# Patient Record
Sex: FEMALE | Race: WHITE
[De-identification: names, ages, dates, MRNs, and addresses within clinical notes are randomized per-mention and may not be internally consistent; named-entity substitution may affect disease eponyms.]

---

## 2017-06-29 ENCOUNTER — HOSPITAL ENCOUNTER (INPATIENT)
Dept: HOSPITAL 36 - ER | Age: 72
LOS: 4 days | Discharge: NURSING, REHAB OR LONG-TERM CARE | DRG: 640 | End: 2017-07-03
Payer: MEDICARE

## 2017-06-29 DIAGNOSIS — K21.9: ICD-10-CM

## 2017-06-29 DIAGNOSIS — E11.9: ICD-10-CM

## 2017-06-29 DIAGNOSIS — M19.90: ICD-10-CM

## 2017-06-29 DIAGNOSIS — E87.1: Primary | ICD-10-CM

## 2017-06-29 DIAGNOSIS — Z88.0: ICD-10-CM

## 2017-06-29 DIAGNOSIS — Z88.1: ICD-10-CM

## 2017-06-29 DIAGNOSIS — I11.0: ICD-10-CM

## 2017-06-29 DIAGNOSIS — Z88.8: ICD-10-CM

## 2017-06-29 DIAGNOSIS — J44.9: ICD-10-CM

## 2017-06-29 DIAGNOSIS — F03.90: ICD-10-CM

## 2017-06-29 DIAGNOSIS — I50.9: ICD-10-CM

## 2017-06-29 DIAGNOSIS — I25.10: ICD-10-CM

## 2017-06-29 DIAGNOSIS — G93.41: ICD-10-CM

## 2017-06-29 DIAGNOSIS — E78.5: ICD-10-CM

## 2017-06-29 DIAGNOSIS — F25.8: ICD-10-CM

## 2017-06-29 LAB
ALBUMIN/GLOB SERPL: 1.2 {RATIO} (ref 1–1.8)
ALP SERPL-CCNC: 51 U/L (ref 34–104)
ALT SERPL-CCNC: 10 U/L (ref 7–52)
ANION GAP SERPL CALC-SCNC: 6.8 MMOL/L (ref 7–16)
AST SERPL-CCNC: 16 U/L (ref 13–39)
BASOPHILS NFR BLD AUTO: 0 % (ref 0–2)
BILIRUB SERPL-MCNC: 0.4 MG/DL (ref 0.3–1)
BUN SERPL-MCNC: 20 MG/DL (ref 7–25)
BUN/CREAT SERPL: 20
CALCIUM SERPL-MCNC: 9.4 MG/DL (ref 8.6–10.3)
CHLORIDE SERPL-SCNC: 98 MEQ/L (ref 98–107)
CO2 SERPL-SCNC: 26.2 MEQ/L (ref 21–31)
CREAT SERPL-MCNC: 1 MG/DL (ref 0.6–1.2)
EOSINOPHIL NFR BLD AUTO: 1.9 % (ref 0–5)
ERYTHROCYTE [DISTWIDTH] IN BLOOD BY AUTOMATED COUNT: 13.2 % (ref 11.5–20)
GLOBULIN SER-MCNC: 2.8 GM/DL
GLUCOSE SERPL-MCNC: 108 MG/DL (ref 70–105)
HCT VFR BLD CALC: 32.9 % (ref 35–45)
HGB BLD-MCNC: 11.3 GM/DL (ref 11.7–16.1)
LYMPHOCYTES NFR BLD AUTO: 26.4 % (ref 20–50)
MCH RBC QN AUTO: 28.4 PG (ref 27–31)
MCHC RBC AUTO-ENTMCNC: 34.2 PG (ref 28–36)
MCV RBC AUTO: 83.3 FL (ref 81–100)
MONOCYTES NFR BLD AUTO: 13.3 % (ref 2–10)
NEUTROPHILS # BLD: 3.7 TH/CMM (ref 1.8–8)
NEUTROPHILS NFR BLD AUTO: 58.4 % (ref 40–80)
PLATELET # BLD: 88 TH/CMM (ref 150–400)
PMV BLD AUTO: 8.2 FL
POTASSIUM SERPL-SCNC: 4 MEQ/L (ref 3.5–5.1)
RBC # BLD AUTO: 3.95 MIL/CMM (ref 3.8–5.2)
SODIUM SERPL-SCNC: 127 MEQ/L (ref 136–145)
WBC # BLD AUTO: 6.4 TH/CMM (ref 4.8–10.8)

## 2017-06-29 PROCEDURE — Z7610: HCPCS

## 2017-06-29 NOTE — ED PHYSICIAN CHART
Chief Complaint/HPI





- Patient Information


Date Seen:: 06/29/17


Time Seen:: 16:30


Chief Complaint:: agitation


History of Present Illness:: 





Patient is sent from the nursing home for agitation to be admitted to [facility] is medically stable.


Allergies:: 


 Allergies











Allergy/AdvReac Type Severity Reaction Status Date / Time


 


cefazolin [From Ancef] Allergy   Verified 06/14/17 18:53


 


meropenem Allergy   Verified 06/14/17 18:53


 


Penicillins Allergy   Verified 06/14/17 18:54


 


prochlorperazine Allergy   Verified 06/14/17 18:53





[From Compazine]     











Historian:: EMS


Review:: Nurse's Note Reviewed





Review of Systems





- Review of Systems


General/Constitutional: No fever, No chills


Skin: No skin lesions


Head: No headache


Eyes: No loss of vision


ENT: No earache


Neck: No neck pain


Cardio Vascular: No chest pain


Pulmonary: No SOB


GI: No nausea, No vomiting


G/U: No dysuria


Musculoskeletal: No bone or joint pain


Endocrine: No polyuria


Psychiatric: Prior psych history


Hematopoietic: No bruising


Allergic/Immuno: No urticaria


Neurological: No syncope





Past Medical History





- Past Medical History


Past Medical History: HTN, DM, CHF, Asthma/COPD, Dementia, Other (

atherosclerotic heart disease; is affective disorder)


Family History: Other (unavailable)


Social History: Care Facility


Surgical History: other (unavailable)


Psychiatricy History: Other (schizoaffective disorder)


Medication: Reviewed





Family Medical History





- Family Member


  ** Father


History Unknown: Yes


Ethnicity: Unknown


Living Status: Unknown


Hx Family Cancer:  (UNKNOWN)


Hx Family Coronary Artery Disease:  (UNKNOWN)


Hx Family Congestive Heart Failure:  (UNKNOWN)


Hx Family Hypertension:  (UNKNOWN)


Hx Family Stroke:  (UNKNOWN)


Hx Family Diabetes:  (UNKNOWN)


Hx Family Seizures:  (UNKNOWN)


Hx Family Dementia:  (UNKNOWN)


Hx Family AIDS:  (UNKNOWN)


Hx Family HIV: No


Hx Family COPD:  (UNKNOWN)


Hx Family Hepatitis:  (UNKNOWN)


Hx Family Psychiatric Problems:  (UNKNOWN)


Hx Family Tuberculosis:  (UNKNOWN)





  ** Mother


History Unknown: Yes


Ethnicity: Unknown


Living Status: Unknown


Hx Family Cancer:  (unknown)


Hx Family Coronary Artery Disease:  (unknown)


Hx Family Congestive Heart Failure:  (unknown)


Hx Family Hypertension:  (unknown)


Hx Family Stroke:  (unknown)


Hx Family Diabetes:  (unknown)


Hx Family Seizures:  (unknown)


Hx Family Dementia:  (unknown)


Hx Family AIDS:  (unknown)


Hx Family COPD:  (unknown)


Hx Family Hepatitis:  (unknown)


Hx Family Psychiatric Problems:  (unknown)


Hx Family Tuberculosis:  (unknown)





Physical Exam





- Physical Examination


General/Constitutional: Awake, Well-developed, well-nourished, Alert


Other Gen/Cons comments:: 


Yelling, screaming, flailing around on the gurney


Head: Atraumatic


Eyes: Lids, conjuctiva normal, PERRL


Skin: Nl inspection


ENMT: External ears, nose nl


Neck: No nuchal rigidity


Respiratory: Nl effort/Exclusion, Clear to Auscultation


Cardio Vascular: RRR


GI: No tenderness/rebounding/guarding


: No CVA tenderness


Extremities: No tenderness or effusion


Neuro/Psych: No focal deficits





Assessment





- Assessment


General Assessment: 





patient refuses all tests





ED Septic Shock





- .


Is Septic Shock (SBP<90, OR Lactate>4 mmol\L) present?: No





Reassessment (Disposition)





- Reassessment


Reassessment Condition:: Improved





- Diagnosis


Diagnosis:: 





metabolic encephalopathy; extreme agitation





- Patient Disposition


Admitted to:: Med/Surg


Spoke to:: Arnoldo Anderson


Admitting Medical Physician:: Arnoldo Anderson


Condition at Disposition:: Stable, Improved

## 2017-06-29 NOTE — ADMIT CRITERIA FORM
Admit Criteria Forms





- Admit Criteria


Diagnosis: 





                            PSYCHIATRIC DISORDERS





                                                                               

                                     


                                                     (Place 'X' for any and all 

applicable criteria):


                                                                        


Ongoing inpatient care may be needed for 1 or more of the following(1)(2)(3)(4)(

6)(7)(8):


[ ]I.     Danger to self or others not manageable at lower level of care.


[ ]II.    Grave disability (eg, inability to perform self care necessary at 

lower level of care)


[X ]III.   Agitation or inappropriate behavior interfering with care for 

primary condition (eg, attempting to 


         discontinue lines or drains prematurely, unable to cooperate with 

respiratory care)


[ ]IV.  Severe disability or disorder indicated by ALL of the following:


         [ ]a)   Severe behavioral health disorder-related symptoms or 

condition indicated by 1 or more of  the following:


                   [ ]i)    Severe problem with cognition, memory, judgment, or 

impulse control


                   [ ]ii)   Severe clinical manifestations (eg, hallucinations, 

delusions, other acute psychotic symptoms, 


                           josé, extreme agitation or anxiety)


          [ ]b)   Patient management at lower level of care is not feasible 

until acute intervention or modification is initiated.








Extended stay beyond goal length of stay for the primary condition may be 

needed until ALLof the


following are present(1)(2)(3)(4)(722)(23):   


[ ]a)    Danger to self or others is absent or manageable at lower level of care


[ ]b)    Behavior crisis management, including physical or chemical restraints, 

is required and  is not 


          available at a lower level of care. 


[ ]c)    Behavioral symptoms (e.g., agitation, somnolence, inappropriate 

behavior) are present, and are 


          not manageable at a lower level of care.


[ ]d)    Patient cannot understand follow-up treatment and crisis plan.


[ ]e)    Provider and supports are  sufficiently available at lower level of 

care.


[ ]f)     Patient can participate (e.g., verify absence of plan for harm) and 

is in needed of monitoring.











The original [facility] content created by [facility] has been revised. 


The portions of the content which have been revised are identified through the 

use of italic text or in bold, and [facility] 


has neither  reviewed nor approved the modified material. All other unmodified 

content is copyright  [facility].





Please see references footnoted in the original [facility] edition 

2017


Admit Criteria Met?: Yes

## 2017-06-30 LAB
ANION GAP SERPL CALC-SCNC: 5.4 MMOL/L (ref 7–16)
BACTERIA #/AREA URNS HPF: (no result) /HPF
BASOPHILS NFR BLD AUTO: 0.6 % (ref 0–2)
BILIRUB UR-MCNC: NEGATIVE MG/DL
BUN SERPL-MCNC: 17 MG/DL (ref 7–25)
BUN/CREAT SERPL: 18.9
CALCIUM SERPL-MCNC: 9.6 MG/DL (ref 8.6–10.3)
CHLORIDE SERPL-SCNC: 100 MEQ/L (ref 98–107)
CO2 SERPL-SCNC: 29.7 MEQ/L (ref 21–31)
COLOR UR: YELLOW
CREAT SERPL-MCNC: 0.9 MG/DL (ref 0.6–1.2)
EOSINOPHIL NFR BLD AUTO: 1.6 % (ref 0–5)
EPI CELLS URNS QL MICRO: (no result) /LPF
ERYTHROCYTE [DISTWIDTH] IN BLOOD BY AUTOMATED COUNT: 13.3 % (ref 11.5–20)
GLUCOSE SERPL-MCNC: 133 MG/DL (ref 70–105)
GLUCOSE UR STRIP-MCNC: NEGATIVE MG/DL
HCT VFR BLD CALC: 37.7 % (ref 35–45)
HGB BLD-MCNC: 12.6 GM/DL (ref 11.7–16.1)
KETONES UR STRIP-MCNC: NEGATIVE MG/DL
LYMPHOCYTES NFR BLD AUTO: 25.3 % (ref 20–50)
MCH RBC QN AUTO: 28.7 PG (ref 27–31)
MCHC RBC AUTO-ENTMCNC: 33.3 PG (ref 28–36)
MCV RBC AUTO: 86.3 FL (ref 81–100)
MONOCYTES NFR BLD AUTO: 13.5 % (ref 2–10)
NEUTROPHILS # BLD: 3.2 TH/CMM (ref 1.8–8)
NEUTROPHILS NFR BLD AUTO: 59 % (ref 40–80)
PH UR STRIP: 6.5 [PH]
PLATELET # BLD: 111 TH/CMM (ref 150–400)
PMV BLD AUTO: 7.3 FL
POTASSIUM SERPL-SCNC: 4.1 MEQ/L (ref 3.5–5.1)
PROT UR STRIP-MCNC: NEGATIVE MG/DL
RBC # BLD AUTO: 4.37 MIL/CMM (ref 3.8–5.2)
RBC # UR STRIP: NEGATIVE /UL
RBC #/AREA URNS HPF: (no result) /HPF (ref 0–5)
SODIUM SERPL-SCNC: 131 MEQ/L (ref 136–145)
UROBILINOGEN UR STRIP-ACNC: 0.2 E.U./DL (ref 0.2–1)
WBC # BLD AUTO: 5.3 TH/CMM (ref 4.8–10.8)

## 2017-07-03 NOTE — GENERAL PROGRESS NOTE
Objective





- Results


Result Diagrams: 


 06/30/17 10:45





 06/30/17 10:45


Recent Labs: 


 Laboratory Last Values











WBC  5.3 Th/cmm (4.8-10.8)   06/30/17  10:45    


 


RBC  4.37 Mil/cmm (3.80-5.20)   06/30/17  10:45    


 


Hgb  12.6 gm/dL (11.7-16.1)   06/30/17  10:45    


 


Hct  37.7 % (35.0-45.0)  D 06/30/17  10:45    


 


MCV  86.3 fl ()   06/30/17  10:45    


 


MCH  28.7 pg (27.0-31.0)   06/30/17  10:45    


 


MCHC Differential  33.3 pg (28.0-36.0)   06/30/17  10:45    


 


RDW  13.3 % (11.5-20.0)   06/30/17  10:45    


 


Plt Count  111 Th/cmm (150-400)  L D 06/30/17  10:45    


 


MPV  7.3 fl  06/30/17  10:45    


 


Neutrophils %  59.0 % (40.0-80.0)   06/30/17  10:45    


 


Lymphocytes %  25.3 % (20.0-50.0)   06/30/17  10:45    


 


Monocytes %  13.5 % (2.0-10.0)  H  06/30/17  10:45    


 


Eosinophils %  1.6 % (0.0-5.0)   06/30/17  10:45    


 


Basophils %  0.6 % (0.0-2.0)   06/30/17  10:45    


 


Sodium  131 mEq/L (136-145)  L  06/30/17  10:45    


 


Potassium  4.1 mEq/L (3.5-5.1)   06/30/17  10:45    


 


Chloride  100 mEq/L ()   06/30/17  10:45    


 


Carbon Dioxide  29.7 mEq/L (21.0-31.0)   06/30/17  10:45    


 


Anion Gap  5.4  (7.0-16.0)  L  06/30/17  10:45    


 


BUN  17 mg/dL (7-25)   06/30/17  10:45    


 


Creatinine  0.9 mg/dL (0.6-1.2)   06/30/17  10:45    


 


Est GFR ( Amer)  TNP   06/30/17  10:45    


 


Est GFR (Non-Af Amer)  TNP   06/30/17  10:45    


 


BUN/Creatinine Ratio  18.9   06/30/17  10:45    


 


Glucose  133 mg/dL ()  H  06/30/17  10:45    


 


Calcium  9.6 mg/dL (8.6-10.3)   06/30/17  10:45    


 


Total Bilirubin  0.4 mg/dL (0.3-1.0)   06/29/17  23:00    


 


AST  16 U/L (13-39)   06/29/17  23:00    


 


ALT  10 U/L (7-52)   06/29/17  23:00    


 


Alkaline Phosphatase  51 U/L ()   06/29/17  23:00    


 


Total Protein  6.1 gm/dL (6.0-8.3)   06/29/17  23:00    


 


Albumin  3.3 gm/dL (3.7-5.3)  L  06/29/17  23:00    


 


Globulin  2.8 gm/dL  06/29/17  23:00    


 


Albumin/Globulin Ratio  1.2  (1.0-1.8)   06/29/17  23:00    


 


TSH  1.82 uIU/ml (0.34-5.60)   06/29/17  23:00    


 


Urine Source  RANDOM   06/30/17  22:00    


 


Urine Color  YELLOW   06/30/17  22:00    


 


Urine Clarity  SLIGHT HAZY  (CLEAR)   06/30/17  22:00    


 


Urine pH  6.5   06/30/17  22:00    


 


Ur Specific Gravity  1.020  (1.005-1.030)   06/30/17  22:00    


 


Urine Protein  NEGATIVE mg/dL (NEGATIVE)   06/30/17  22:00    


 


Urine Glucose (UA)  NEGATIVE mg/dL (NEGATIVE)   06/30/17  22:00    


 


Urine Ketones  NEGATIVE mg/dL (NEGATIVE)   06/30/17  22:00    


 


Urine Blood  NEGATIVE  (NEGATIVE)   06/30/17  22:00    


 


Urine Nitrate  NEGATIVE  (NEGATIVE)   06/30/17  22:00    


 


Urine Bilirubin  NEGATIVE  (NEGATIVE)   06/30/17  22:00    


 


Urine Urobilinogen  0.2 E.U./dL (0.2 - 1.0)   06/30/17  22:00    


 


Ur Leukocyte Esterase  TRACE  (NEGATIVE)  H  06/30/17  22:00    


 


Urine RBC  0-2 /hpf (0-5)   06/30/17  22:00    


 


Urine WBC  10-25 /hpf (0-5)  H  06/30/17  22:00    


 


Ur Epithelial Cells  FEW /lpf (FEW)   06/30/17  22:00    


 


Urine Bacteria  MODERATE /hpf (NONE SEEN)   06/30/17  22:00    


 


RPR  NONREACTIVE  (NONREACTIVE)   06/29/17  23:00    














- Physical Exam


Vitals and I&O: 


 Vital Signs











Temp  97.7 F   07/03/17 13:51


 


Pulse  67   07/03/17 13:51


 


Resp  19   07/03/17 13:51


 


BP  110/45   07/03/17 13:51


 


Pulse Ox  98   07/03/17 13:51








 Intake & Output











 07/02/17 07/03/17 07/03/17





 18:59 06:59 18:59


 


Intake Total 780  


 


Balance 780  


 


Weight (lbs) 98.883 kg 100.244 kg 


 


Intake:   


 


  Oral 780  


 


Other:   


 


  # Voids 3  


 


  # Bowel Movements 0  











Active Medications: 


Current Medications





Acetaminophen (Tylenol)  650 mg PO Q4HR PRN


   PRN Reason: pain  


   Stop: 08/28/17 19:28


   Last Admin: 07/02/17 21:19 Dose:  650 mg


Al Hydrox/Mg Hydrox/Simethicone (Maalox)  30 ml PO Q4HR PRN


   PRN Reason: GI DISTRESS


   Stop: 08/28/17 19:28


Benztropine Mesylate (Cogentin)  0.5 mg PO TID [facility]


   Stop: 08/28/17 20:59


   Last Admin: 07/03/17 13:15 Dose:  0.5 mg


Bisacodyl (Dulcolax 10 Mg Supp)  10 mg RC DAILY PRN


   PRN Reason: Constipation


   Stop: 08/28/17 19:28


Divalproex Sodium (Depakote Dr)  500 mg PO BID [facility]


   Stop: 08/29/17 08:59


   Last Admin: 07/03/17 08:10 Dose:  500 mg


Docusate Sodium (Colace)  250 mg PO DAILY [facility]


   Stop: 08/29/17 08:59


   Last Admin: 07/03/17 08:10 Dose:  250 mg


Enalapril Maleate (Vasotec)  5 mg PO DAILY [facility]


   Stop: 08/29/17 08:59


   Last Admin: 07/03/17 08:19 Dose:  5 mg


Ferrous Sulfate (Iron)  325 mg PO BID [facility]


   Stop: 08/29/17 08:59


   Last Admin: 07/03/17 08:12 Dose:  325 mg


Furosemide (Lasix)  40 mg PO DAILY [facility]


   Stop: 08/29/17 08:59


   Last Admin: 07/03/17 08:12 Dose:  40 mg


Heparin Sodium (Porcine) (Heparin)  5,000 units SUBQ Q12HR [facility]


   Stop: 08/31/17 08:59


   Last Admin: 07/03/17 08:10 Dose:  5,000 units


Lorazepam (Ativan)  1 mg IVP Q4HR PRN; Protocol


   PRN Reason: Anxiety


   Stop: 08/28/17 19:28


Magnesium Hydroxide (Milk Of Magnesia)  30 ml PO HS PRN


   PRN Reason: Constipation


   Stop: 08/28/17 19:28


Memantine (Namenda)  5 mg PO BID [facility]


   Stop: 08/29/17 08:59


   Last Admin: 07/03/17 08:12 Dose:  5 mg


Miscellaneous (Vte Chemical Prophylaxis Screen/ Admission)  1 ea [facility] PRN PRN


   PRN Reason: PROTOCOL


   Stop: 08/29/17 14:45


Miscellaneous (Clinical Monitoring)  1 ea MC DAILY PRN


   PRN Reason: ON HEPARIN


   Stop: 08/29/17 14:45


Risperidone (Risperdal)  2 mg PO BID [facility]


   PRN Reason: Protocol


   Stop: 08/29/17 08:59


   Last Admin: 07/03/17 08:11 Dose:  2 mg


Simvastatin (Zocor)  20 mg PO HS [facility]


   Stop: 08/28/17 20:59


   Last Admin: 07/02/17 21:00 Dose:  20 mg


Trazodone HCl (Desyrel)  25 mg PO HS [facility]


   PRN Reason: Protocol


   Stop: 08/28/17 20:59


   Last Admin: 07/02/17 21:00 Dose:  25 mg











- Procedures


Procedures: 


 Procedures











Procedure Code Date


 


GROUP PSYCHOTHERAPY 89077 01/26/16


 


GROUP PSYCHOTHERAPY [id number] 01/26/16


 


OTHER GROUP THERAPY 94.44 04/09/15














Assessment/Plan





- Problem List


Patient Problems: 


All Active Problems





Arthritis (Acute) M19.90


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


Ataxia (Acute) R27.0


CHF (congestive heart failure) (Acute) I50.9


DELUSIONAL AND LABILE BEHAVIOR (Acute) 


Dementia (Acute) F03.90


Diabetes (Acute) E11.9


Difficulty in walking (Acute) R26.2


Disorder of joint (Acute) M25.9


GERD (gastroesophageal reflux disease) (Acute) K21.9


Hyperlipidemia (Acute) E78.5


Hypertension (Acute) I10


Schizoaffective disorder (Acute) F25.9


Seizure (Acute)

## 2018-10-09 ENCOUNTER — HOSPITAL ENCOUNTER (INPATIENT)
Dept: HOSPITAL 36 - ER | Age: 73
LOS: 10 days | Discharge: NURSING, REHAB OR LONG-TERM CARE | DRG: 885 | End: 2018-10-19
Attending: PSYCHIATRY & NEUROLOGY | Admitting: PSYCHIATRY & NEUROLOGY
Payer: MEDICARE

## 2018-10-09 VITALS — SYSTOLIC BLOOD PRESSURE: 109 MMHG | DIASTOLIC BLOOD PRESSURE: 77 MMHG

## 2018-10-09 DIAGNOSIS — Z82.49: ICD-10-CM

## 2018-10-09 DIAGNOSIS — F03.90: ICD-10-CM

## 2018-10-09 DIAGNOSIS — E11.40: ICD-10-CM

## 2018-10-09 DIAGNOSIS — J44.9: ICD-10-CM

## 2018-10-09 DIAGNOSIS — I25.10: ICD-10-CM

## 2018-10-09 DIAGNOSIS — Z88.0: ICD-10-CM

## 2018-10-09 DIAGNOSIS — E78.5: ICD-10-CM

## 2018-10-09 DIAGNOSIS — E11.51: ICD-10-CM

## 2018-10-09 DIAGNOSIS — I11.0: ICD-10-CM

## 2018-10-09 DIAGNOSIS — M81.0: ICD-10-CM

## 2018-10-09 DIAGNOSIS — M19.90: ICD-10-CM

## 2018-10-09 DIAGNOSIS — K27.9: ICD-10-CM

## 2018-10-09 DIAGNOSIS — F25.9: Primary | ICD-10-CM

## 2018-10-09 DIAGNOSIS — Z88.8: ICD-10-CM

## 2018-10-09 DIAGNOSIS — I50.9: ICD-10-CM

## 2018-10-09 LAB
ALBUMIN SERPL-MCNC: 3.9 GM/DL (ref 3.7–5.3)
ALBUMIN/GLOB SERPL: 1.3 {RATIO} (ref 1–1.8)
ALP SERPL-CCNC: 51 U/L (ref 34–104)
ALT SERPL-CCNC: 11 U/L (ref 7–52)
AMPHET UR-MCNC: NEGATIVE NG/ML
ANION GAP SERPL CALC-SCNC: 10.9 MMOL/L (ref 7–16)
APAP SERPL-MCNC: < 10 UG/ML (ref 10–30)
APPEARANCE UR: CLEAR
AST SERPL-CCNC: 16 U/L (ref 13–39)
BACTERIA #/AREA URNS HPF: (no result) /HPF
BARBITURATES UR-MCNC: NEGATIVE UG/ML
BASOPHILS # BLD AUTO: 0 TH/CUMM (ref 0–0.2)
BASOPHILS NFR BLD AUTO: 0.5 % (ref 0–2)
BENZODIAZEPINES PNL UR: POSITIVE
BILIRUB SERPL-MCNC: 0.3 MG/DL (ref 0.3–1)
BILIRUB UR-MCNC: NEGATIVE MG/DL
BUN SERPL-MCNC: 24 MG/DL (ref 7–25)
CALCIUM SERPL-MCNC: 9.5 MG/DL (ref 8.6–10.3)
CANNABINOIDS SERPL QL CFM: NEGATIVE
CHLORIDE SERPL-SCNC: 98 MEQ/L (ref 98–107)
CHOLEST SERPL-MCNC: 132 MG/DL (ref ?–200)
CO2 SERPL-SCNC: 28.1 MEQ/L (ref 21–31)
COCAINE METAB.OTHER UR-MCNC: NEGATIVE NG/ML
COLOR UR: YELLOW
CREAT SERPL-MCNC: 0.9 MG/DL (ref 0.6–1.2)
EOSINOPHIL # BLD AUTO: 0.1 TH/CMM (ref 0.1–0.4)
EOSINOPHIL NFR BLD AUTO: 2.1 % (ref 0–5)
EPI CELLS URNS QL MICRO: (no result) /LPF
ERYTHROCYTE [DISTWIDTH] IN BLOOD BY AUTOMATED COUNT: 12.3 % (ref 11.5–20)
GLOBULIN SER-MCNC: 3.1 GM/DL
GLUCOSE SERPL-MCNC: 121 MG/DL (ref 70–105)
GLUCOSE UR STRIP-MCNC: NEGATIVE MG/DL
HCT VFR BLD CALC: 36.1 % (ref 41–60)
HDLC SERPL-MCNC: 61 MG/DL (ref 23–92)
HGB BLD-MCNC: 12.2 GM/DL (ref 12–16)
KETONES UR STRIP-MCNC: NEGATIVE MG/DL
LEUKOCYTE ESTERASE UR-ACNC: (no result)
LYMPHOCYTE AB SER FC-ACNC: 1.3 TH/CMM (ref 1.5–3)
LYMPHOCYTES NFR BLD AUTO: 24.8 % (ref 20–50)
MCH RBC QN AUTO: 30 PG (ref 27–31)
MCHC RBC AUTO-ENTMCNC: 33.6 PG (ref 28–36)
MCV RBC AUTO: 89.3 FL (ref 81–100)
METHADONE UR CFM-MCNC: NEGATIVE NG/ML
METHAMPHET UR QL: NEGATIVE
MICRO URNS: YES
MONOCYTES # BLD AUTO: 0.5 TH/CMM (ref 0.3–1)
MONOCYTES NFR BLD AUTO: 9.3 % (ref 2–10)
NEUTROPHILS # BLD: 3.5 TH/CMM (ref 1.8–8)
NEUTROPHILS NFR BLD AUTO: 63.3 % (ref 40–80)
NITRITE UR QL STRIP: NEGATIVE
OPIATES UR QL: NEGATIVE
PCP UR-MCNC: NEGATIVE UG/L
PH UR STRIP: 6 [PH] (ref 4.6–8)
PLATELET # BLD: 140 TH/CMM (ref 150–400)
PMV BLD AUTO: 7.2 FL
POTASSIUM SERPL-SCNC: 4 MEQ/L (ref 3.5–5.1)
PROT UR STRIP-MCNC: NEGATIVE MG/DL
RBC # BLD AUTO: 4.05 MIL/CMM (ref 3.8–5.2)
RBC # UR STRIP: NEGATIVE /UL
RBC #/AREA URNS HPF: (no result) /HPF (ref 0–5)
SALICYLATES SERPL-MCNC: < 25 MG/L (ref 30–100)
SODIUM SERPL-SCNC: 133 MEQ/L (ref 136–145)
SP GR UR STRIP: 1.01 (ref 1–1.03)
TRICYCLICS UR QL: NEGATIVE
TRIGL SERPL-MCNC: 57 MG/DL (ref ?–150)
URINALYSIS COMPLETE PNL UR: (no result)
UROBILINOGEN UR STRIP-ACNC: 0.2 E.U./DL (ref 0.2–1)
WBC # BLD AUTO: 5.4 TH/CMM (ref 4.8–10.8)
WBC #/AREA URNS HPF: (no result) /HPF (ref 0–5)

## 2018-10-09 PROCEDURE — Z7610: HCPCS

## 2018-10-09 PROCEDURE — G0410 GRP PSYCH PARTIAL HOSP 45-50: HCPCS

## 2018-10-09 NOTE — ED PHYSICIAN CHART
ED Chief Complaint/HPI





- Patient Information


Date Seen:: 10/09/18


Time Seen:: 14:10


Chief Complaint:: Agitation


History of Present Illness:: 





onset x 3 days of agitation and hostile/aggressive behavior; no report of SIs, 

trauma, H/As, neck pain, C/P, SOB, Abd. Pain, A/N/V/D/C, fever, chills, or 

urinary s/s


Allergies:: 


 Allergies











Allergy/AdvReac Type Severity Reaction Status Date / Time


 


cefazolin [From Ancef] Allergy   Verified 06/14/17 18:53


 


meropenem Allergy   Verified 06/14/17 18:53


 


Penicillins Allergy   Verified 06/14/17 18:54


 


prochlorperazine Allergy   Verified 06/14/17 18:53





[From Compazine]     











Historian:: Patient, EMS


Review:: Nurse's Note Reviewed, Old Chart Reviewed, EMS run form Reviewed





ED Review of Systems





- Review of Systems


General/Constitutional: No fever, No chills, No weight loss, No weakness, No 

diaphoresis, No edema, No loss of appetite


Skin: No skin lesions, No rash, No bruising


Head: No headache, No light-headedness


Eyes: No loss of vision, No pain, No diplopia


ENT: No earache, No nasal drainage, No sore throat, No tinnitus


Neck: No neck pain, No swelling, No thyromegaly, No stiffness, No mass noted


Cardio Vascular: No chest pain, No palpitations, No PND, No orthopnea, No edema


Pulmonary: No SOB, No cough, No sputum, No wheezing


GI: No nausea, No vomiting, No diarrhea, No pain, No melena, No hematochezia, 

No constipation, No hematemesis


G/U: No dysuria, No frequency, No hematuria, No nacturia


Ob/Gyn: No vaginal discharge, No abnormal vaginal bleed, No contraction


Musculoskeletal: No bone or joint pain, No back pain, No muscle pain


Endocrine: No polyuria, No polydipsia


Psychiatric: Prior psych history, No depression, Anxiety, No suicidal ideation, 

No homicidal ideation, No auditory hallucination, No visual hallucination


Hematopoietic: No bruising, No lymphadenopathy


Allergic/Immuno: No urticaria, No angioedema


Neurological: No syncope, No focal symptoms, No weakness, No paresthesia, No 

headache, No seizure, No dizziness, No confusion, No vertigo





ED Past Medical History





- Past Medical History


Obtainable: Yes


Past Medical History: HTN, Dyslipidemia


Family History: HTN


Social History: Non Smoker, No Alcohol, No Drug Use, Single, Care Facility


Surgical History: None


Psychiatricy History: Bipolar


Medication: Reviewed





Family Medical History





- Family Member


  ** Father


History Unknown: Yes


Ethnicity: Unknown


Living Status: Unknown


Hx Family Cancer:  (UNKNOWN)


Hx Family Coronary Artery Disease:  (UNKNOWN)


Hx Family Congestive Heart Failure:  (UNKNOWN)


Hx Family Hypertension:  (UNKNOWN)


Hx Family Stroke:  (UNKNOWN)


Hx Family Diabetes:  (UNKNOWN)


Hx Family Seizures:  (UNKNOWN)


Hx Family Dementia:  (UNKNOWN)


Hx Family AIDS:  (UNKNOWN)


Hx Family HIV: No


Hx Family COPD:  (UNKNOWN)


Hx Family Hepatitis:  (UNKNOWN)


Hx Family Psychiatric Problems:  (UNKNOWN)


Hx Family Tuberculosis:  (UNKNOWN)





  ** Mother


History Unknown: Yes


Ethnicity: Unknown


Living Status: Unknown


Hx Family Cancer:  (unknown)


Hx Family Coronary Artery Disease:  (unknown)


Hx Family Congestive Heart Failure:  (unknown)


Hx Family Hypertension:  (unknown)


Hx Family Stroke:  (unknown)


Hx Family Diabetes:  (unknown)


Hx Family Seizures:  (unknown)


Hx Family Dementia:  (unknown)


Hx Family AIDS:  (unknown)


Hx Family COPD:  (unknown)


Hx Family Hepatitis:  (unknown)


Hx Family Psychiatric Problems:  (unknown)


Hx Family Tuberculosis:  (unknown)





ED Physical Exam





- Physical Examination


General/Constitutional: Awake, Well-developed, well-nourished, Alert, No 

distress, GCS 15, Non-toxic appearing, Ambulatory


Head: Atraumatic


Eyes: Lids, conjuctiva normal, PERRL, EOMI


Skin: Nl inspection, No rash, No skin lesions, No ecchymosis, Well hydrated, No 

lymphadenopathy


ENMT: External ears, nose nl, Nasal exam nl, Lips, teeth, gums nl


Neck: Nontender, Full ROM w/o pain, No JVD, No nuchal rigidity, No bruit, No 

mass, No stridor


Respiratory: Nl effort/Exclusion, Clear to Auscultation, No Wheeze/Rhonchi/Rales


Cardio Vascular: RRR, No murmur, gallop, rubs, NL S1 S2, Carotid/Femoral/Distal 

pulses equal bilaterally


GI: No tenderness/rebounding/guarding, No organomegaly, No hernia, Normal BS's, 

Nondistended, No mass/bruits, No McBurney tenderness


: No CVA tenderness


Extremities: No tenderness or effusion, Full ROM, normal strength in all 

extremities, No edema, Normal digits & nails


Neuro/Psych: Alert/oriented, DTR's symmetric, Normal sensory exam, Normal motor 

strength, Judgement/insight normal, Mood normal, Normal gait, No focal deficits


Other Neuro/Psych comments:: 





+ Psychomotor Agitation; no SIs; Mood/Affect: Labile


Misc: Normal back, No paraspinal tenderness





ED Labs/Radiology/EKG Results





- Lab Results


Comments:: 





Reviewed





- EKG Interpretations


EKG Time:: 14:45


Rate & Rhythm: 76; NSR


Comments:: 





non-specific st-t changes





ED Septic Shock





- .


Is Septic Shock (SBP<90, OR Lactate>4 mmol\L) present?: No





ED Reassessment (Disposition)





- Reassessment


Reassessment Condition:: Improved





- Diagnosis


Diagnosis:: 





Agitation; Medical Clearance; Psychosis; Bipolar Disorder





- Aftercare/Follow up Instructions


Aftercare/Follow-Up Instructions:: Counseled pt regarding lab results/diagnosis 

& need follow up, Counseled pt & family regarding lab results/diagnosis & need 

follow up





- Patient Disposition


Discharge/Transfer:: Acute Care w/in this hosp


Admitted to:: [facility]


Condition at Disposition:: Stable, Improved

## 2018-10-09 NOTE — HISTORY & PHYSICAL
ADMIT DATE:  10/09/2018



INTERNAL MEDICINE CONSULTATION



HISTORY OF PRESENT ILLNESS:  The patient is a 73-year-old female, the patient of

mine.



CURRENT MEDICAL PROBLEMS:  Include CHF, COPD, hypertension, coronary artery

disease, hyperlipidemia, peptic ulcer disease, arthritis, osteoporosis.



SOCIAL HISTORY:  No current history of smoking or alcohol abuse.



FAMILY HISTORY:  Not available.



REVIEW OF SYSTEMS:  Minimal short of breath.  No nausea, no vomiting, no

abdominal pain.  Extensive arthritic pain.



PHYSICAL EXAMINATION:

GENERAL:  Average female, in no obvious respiratory distress.

VITAL SIGNS:  Include a blood pressure 140/80, heart rate 80, respiration rate

of 18.

SKIN:  Show no cellulitis.

HEENT:  Normal conjunctivae.

NECK:  Supple.

LUNGS:  Show occasional rhonchi, occasional crepitation, no bronchial breathing.

CARDIOVASCULAR SYSTEM:  First and second heart sounds are normal.  No gallops. 

Systolic present.

ABDOMEN:  Soft, minimal epigastric.  Bowel sounds are present and good.

EXTREMITIES:  Show arthritis.

NEUROLOGIC:  The patient has dementia.



LABORATORY DATA:  White count 5.4, hemoglobin 12.2, hematocrit 36.1, platelet of

140.  Sodium 133, potassium 4, chloride 98, bicarbonate 28.1, BUN 24, creatinine

0.9, blood sugar of 121.  Urine is negative.  LFTs are normal.



MEDICAL DIAGNOSES:  As I dictated above.



MEDICATIONS:  Current medicine include Tylenol, Maalox, Lipitor, Cogentin,

Dulcolax, Depakote, Colace, Vasotec, iron supplementation, and Lasix.





DD: 10/09/2018 18:41

DT: 10/09/2018 21:07

JOB# [id number]  [id number]

## 2018-10-10 NOTE — PROGRESS NOTES
DATE:  10/10/2018



INTERNAL MEDICINE CONSULTATION FOLLOWUP



SUBJECTIVE:  The patient is a 73-year-old female.  Current medical problems

include diabetes mellitus, COPD, CHF, hypertension, coronary artery disease,

arthritis, peptic ulcer disease.



CHIEF COMPLAINT:  No new symptoms.



OBJECTIVE:

VITAL SIGNS:  Stable.

LUNGS:  Show occasional rhonchi, occasional crepitation, no bronchial breathing.

HEART EXAM:  Frist and second heart sound are normal.  No gallops.  Systolic

present.

ABDOMEN:  Soft.  Bowel sounds are present and good.

EXTREMITIES:  Arthritis.

NEUROLOGICAL:  Dementia.



PLAN:  Continue current medical management.  Psych consult reviewed.





DD: 10/10/2018 14:25

DT: 10/10/2018 22:55

JOB# [id number]  [id number]

## 2018-10-11 NOTE — PROGRESS NOTES
DATE:  10/11/2018



INTERNAL MEDICINE CONSULTATION FOLLOWUP



SUBJECTIVE:  The patient is a 73-year-old female, current medical problems

include diabetes mellitus, COPD, CHF, hypertension, coronary artery disease,

peptic ulcer, gastritis, and arthritis.



CHIEF COMPLAINT:  No new symptoms.  Blood sugars are stable.



OBJECTIVE:

VITAL SIGNS:  Stable.

LUNGS:  Show occasional rhonchi, occasional crepitation, no bronchial breathing.

HEART:  First and second heart sounds normal.  No gallop.  Systolic present.

ABDOMEN:  Soft.  Bowel sounds are good.

EXTREMITIES:  Show arthritis.

NEUROLOGIC:  The patient has dementia.  



Continue current medical treatments.  Psych consult reviewed.





DD: 10/11/2018 11:11

DT: 10/11/2018 18:16

JOB# [id number]  [id number]

## 2018-10-11 NOTE — PSYCHIATRIC EVALUATION
DATE OF SERVICE:  10/10/2018



IDENTIFYING DATA:  The patient is a 73-year-old  woman, resident of

[facility] and nursing facility.  Information obtained directly

interviewing the patient as well as reviewing the admission papers.



JUSTIFICATION FOR HOSPITALIZATION:  The patient is admitted on a voluntary basis

in view of her acute agitation, screaming, and yelling.  The patient could not

be contained at a lower level of care.  The patient prior to the hospitalization

has been irritable and angry and has not been able to follow the directions. 

The patient has been diagnosed to have schizoaffective disorder and has been

treated in here before with Depakote and risperidone.  The patient is currently

on 2.5 mg twice a day of the risperidone and also the patient has been on the

Depakote 250 mg twice a day.  The patient has been having difficult time with

the coping skills.  The patient is screaming and yelling, during the evaluation,

the patient has not been able to follow the directions.



PAST PSYCHIATRIC HISTORY:  Please refer to the above.



MEDICAL HISTORY:  Physical examination is requested to be done by Dr. Anderson.



SUBSTANCE ABUSE HISTORY:  None.



PHYSICAL OR SEXUAL ABUSE HISTORY:  None.



LEGAL PROBLEMS:  None at this time.



STRENGTH AND ASSETS:  The patient is motivated.



MENTAL STATUS EXAMINATION:  The patient is a 73-year-old, looking her stated

age, superficially cooperative.  Eye contact is poor.  Mood is noted to be

irritable.  Affect is constricted.  The patient is screaming and yelling at this

time.  The patient has been having difficult time to cope with the stress.  The

patient is very paranoid.  Acute mood swings are noted.  Short term memory is

noted to be poor.  Long-term memory seems to be fair at this time.  Attention

span and concentration are noted to be poor.  The patient's behavior is clear

danger to self and others.  The patient is very paranoid and has been closing

her ears and screaming and running away in the wheelchair.



DIAGNOSTIC IMPRESSION:

AXIS I:  Schizoaffective disorder.

AXIS II:  None.

AXIS III:  As per Dr. Anderson.



IMMEDIATE TREATMENT PLAN:  The patient is going to be observed on the inpatient

unit, provided with supportive psychotherapy.  The patient is going to be

closely monitored.  Once stabilized, the patient is going to be discharged to

[facility] to be followed up on an outpatient basis.





DD: 10/10/2018 18:48

DT: 10/11/2018 01:51

JOB# [id number]  [id number]

## 2018-10-11 NOTE — PROGRESS NOTES
DATE:  10/11/2018



PSYCHIATRIC PROGRESS NOTE



SUBJECTIVE:  Staff was spoken to.  The patient is interviewed.  Mood is noted to

be irritable.  Affect is constricted.  Insight and judgment are noted to be

still impaired.  Impulse control is noted to be poor.  The patient is screaming

and yelling.  The patient is going to be given a dose of Zyprexa and Benadryl at

this time.  The patient continues to be very paranoid.  The patient has been

currently on 2 mg twice a day of the Risperdal and has been able to tolerate the

medication.



ASSESSMENT:  The patient is still grossly psychotic.



PLAN:  To continue the patient with the supportive therapy and followup.





DD: 10/11/2018 11:22

DT: 10/11/2018 22:19

JOB# [id number]  [id number]

## 2018-10-12 NOTE — PROGRESS NOTES
DATE:  10/12/2018



INTERNAL MEDICINE CONSULTATION FOLLOWUP



SUBJECTIVE:  The patient is a 73-year-old female.  Current medical problems

include diabetes mellitus, angiopathy, neuropathy, COPD, CHF, hypertension,

coronary artery disease, peptic ulcer disease, gastritis, arthritis.  No new

symptoms.



OBJECTIVE:

VITAL SIGNS:  Stable.

LUNGS:  Clear.

HEART:  First and second heart sounds normal.  No gallop.  Systolic present.

ABDOMEN:  Soft.  Bowel sounds are present and good.

EXTREMITIES:  Show arthritis.

NEUROLOGIC:  The patient has dementia.



PLAN:  Continue current medical management.  Psych consult reviewed.





DD: 10/12/2018 14:38

DT: 10/12/2018 22:50

JOB# [id number]  [id number]

## 2018-10-13 NOTE — PROGRESS NOTES
DATE:  10/12/2018



SUBJECTIVE:  Staff was spoken to.  The patient is interviewed.  Mood is noted to

be irritable.  Affect is constricted.  Insight and judgment at this time are

noted to be still impaired.  The patient is screaming and yelling.  The patient

has no insight into her illness.  The patient needs to be redirected.  The

patient has been on 250 mg twice a day of the Depakote and the patient is also

getting the risperidone 2 mg twice a day.  In view of her acute mood swings and

impulsivity, the Depakote is going to be changed to 500 mg twice a day and the

patient is going to be followed up with the supportive therapy.



ASSESSMENT:  The patient is still grossly psychotic and impulsive.



PLAN:  To continue the patient with supportive therapy and followup.





DD: 10/12/2018 18:23

DT: 10/13/2018 06:10

[facility]# [id number]  [id number]

## 2018-10-13 NOTE — PROGRESS NOTES
DATE:  10/13/2018



SUBJECTIVE:  Staff was spoken to.  The patient is interviewed.  Mood is noted to

be irritable.  Affect is constricted.  Insight and judgment at this time are

noted to be still impaired.  Impulse control is noted to be limited.  The

patient has been screaming and yelling.  The patient has no insight into her

illness.  The patient's coping skills at this time are noted to be very poor. 

The patient is currently on risperidone and Depakote and has been able to

tolerate the medications.



ASSESSMENT:  The patient is still impulsive and psychotic.



PLAN:  To continue the patient with supportive therapy and follow.





DD: 10/13/2018 09:30

DT: 10/13/2018 21:34

JOB# [id number]  [id number]

## 2018-10-14 NOTE — PROGRESS NOTES
DATE:  10/13/2018



INTERNAL MEDICINE CONSULTATION FOLLOWUP



SUBJECTIVE:  The patient is a 73-year-old female.  Current medical problems

include diabetes mellitus, COPD, CHF, hypertension, coronary artery disease,

peptic ulcer disease, arthritis.  No new symptoms.  Blood sugars are stable.  No

vomiting, no diarrhea.



OBJECTIVE:

LUNGS:  Clear.

HEART:  First and second heart sounds normal.  No gallops.  Systolic present.

ABDOMEN:  Soft, minimal epigastric tenderness.  Bowel sounds are present and

good.

EXTREMITIES:  Show arthritis.

NEUROLOGIC:  The patient has dementia.



PLAN:  Continue current medical management.  Psych consult reviewed.





DD: 10/13/2018 11:16

DT: 10/14/2018 06:03

JOB# [id number]  [id number]

## 2018-10-14 NOTE — CONSULTATION
DATE OF CONSULTATION:     10/12/2018



REFERRING PHYSICIAN:  Deanna Madden M.D.



TYPE OF CONSULTATION:  Psychology.



HISTORY OF PRESENT ILLNESS:  The patient is a 73-year-old  female.  The

patient is a resident of [facility].  The following is by record review

and by patient's self report.  The patient is being admitted due to acute

agitation as well as yelling episodes.  Staff at the patient's facility report

that she had become irritable, angry, and unable to follow through with staff

directions.  Upon interview, the patient continues to have intermittent yelling

episodes and is guarded as well as suspicious.  The patient is not following the

instructions for cognitive refocusing.  Continuing clinical interviewing will be

followed up as the patient is declining to answer.  The patient did not answer 

questions about experiencing suicidal ideation, plan, or intention.



PAST MEDICAL HISTORY:  Please see history and physical by Dr. Anderson.



PAST PSYCHIATRIC HISTORY:  The patient has a history of schizoaffective

disorder.  The patient is under the care of a psychiatrist at her placement. 

The patient has had previous hospitalizations.



SUBSTANCE ABUSE HISTORY:  None.



PSYCHOSOCIAL HISTORY:  The patient did not answer questions about occupational

or educational history or [employer] affiliation.  She did not answer questions

about history and physical or sexual abuse.  She did not answer questions about

current legal problems or family history or family relationships or current

support system.



MENTAL STATUS EXAMINATION:  The patient appears to be her stated age.  The

patient's attitude is superficially cooperative.  Eye contact is poor.  Mood is

irritable.  Affect is constricted.  Speech is intermittently yelling and then

becoming selectively mute.  Thought process shows to include loose 
associations.  

There are apparent mood fluctuations and the patient seems to be experiencing 
paranoid

ideation.  The patient continues to be guarded and suspicious throughout the

clinical interview and mental status examination.  The patient's behavior has

been difficult to redirect.  Impulse control is impaired.  Concentration is

poor.  The patient did not participate in the memory assessment.  Immediate

memory and short term memory appear to be impaired.  Long-term memory needs

further evaluation.  Sensorium is alert and oriented to self and place only. 

The patient did not participate in the interpretation of proverbs.  Insight is 
poor.

Judgment is impaired.



DIAGNOSTIC IMPRESSION:

AXIS I:  History of schizoaffective disorder.

AXIS II:  Deferred.

AXIS III:  Per Dr. Anderson.



TREATMENT PLAN:  The patient has been seen by Dr. Madden for psychiatric

evaluation and for the management of the patient's psychotropic medications.  We

will provide supportive psychotherapy to include reality orientation,

differentiation, and integration.  We will provide de-escalation as well as

limit setting for the patient to become behaviorally redirectable and respond to

staff direction.  We will provide coping strategies for phase of life issues. 

We will encourage the patient to be able to demonstrate emotional and

self-regulation prior to her discharge.  We will provide coping strategies for

chronic severe mental illness as well.



Thank you, Dr. Madden, for this consult and the opportunity to participate

in this patient's care.





DD: 10/13/2018 13:50

DT: 10/14/2018 00:04

JOB# [id number]  [id number]

ETIENNE

## 2018-10-14 NOTE — PROGRESS NOTES
DATE:  10/14/2018



PSYCHIATRIC PROGRESS NOTE



PROGRESS ON THE UNIT:  Staff was spoken to.  The patient is interviewed.  Mood

is noted to be irritable.  Affect is constricted.  The patient continues to be

screaming and yelling.  Insight and judgment at this time are noted to be still

impaired.  Impulse control is noted to be limited.  The patient has been having

difficult time to cope with the stress.  The patient is currently on 2 mg twice

a day of Risperdal and the patient is also on 1000 mg of Depakote a day.



ASSESSMENT:  The patient is still having mood swings and constantly screaming

and yelling.



PLAN:  To continue the patient with current medications and followup.





DD: 10/14/2018 10:25

DT: 10/14/2018 11:19

JOB# [id number]  [id number]

## 2018-10-15 NOTE — PROGRESS NOTES
DATE:  10/15/2018



SUBJECTIVE:  Staff was spoken to.  The patient is interviewed.  Mood is noted to

be irritable.  Affect is constricted.  Insight and judgment at this time are

noted to be still impaired.  Impulse control is noted to be poor.  The patient

is screaming and yelling.  The patient has no insight into her illness. 

Continues to be very paranoid and impulsive.  The patient is not ready to be

discharged to a lower level of care.



ASSESSMENT:  The patient is still grossly psychotic.



PLAN:  To continue the patient with supportive therapy and followup.





DD: 10/15/2018 18:26

DT: 10/15/2018 23:27

JOB# [id number]  [id number]

## 2018-10-15 NOTE — PROGRESS NOTES
DATE:  10/14/2018



INTERNAL MEDICINE CONSULTATION FOLLOWUP



HISTORY OF PRESENT ILLNESS:  A 73-year-old female, current medical problems

include diabetes mellitus, COPD, CHF, hypertension, coronary artery disease,

peptic ulcer, gastritis, and arthritis.  No new symptoms.



OBJECTIVE:

VITAL SIGNS:  Stable.

LUNGS:  Show occasional rhonchi, occasional crepitation, no bronchial breathing.

HEART:  First and second heart sounds normal.  No gallop.  Systolic present.

ABDOMEN:  Soft, minimal epigastric tenderness.  Bowel sounds are good.

EXTREMITIES:  Show arthritis.

NEUROLOGIC:  The patient has advanced dementia.



PLAN:  Continue current medical management.  Psych consult reviewed.





DD: 10/14/2018 11:26

DT: 10/15/2018 03:04

JOB# [id number]  [id number]

## 2018-10-16 NOTE — PROGRESS NOTES
DATE:  10/16/2018



INTERNAL MEDICINE CONSULTATION FOLLOWUP



SUBJECTIVE:  The patient is a 73-year-old female.  Current medical problems

include diabetes mellitus, angiopathy, neuropathy, COPD, CHF, hypertension,

coronary artery disease, peptic ulcer disease, arthritis, osteoporosis.  No new

symptoms.  Blood sugars are stable.



OBJECTIVE:

VITAL SIGNS:  Stable.

LUNGS:  Show occasional rhonchi, occasional crepitation, no bronchial breathing.

HEART EXAM:  First and second heart sound normal.  No gallop.  Systolic present.

ABDOMEN:  Soft.  Bowel sounds good.

EXTREMITIES:  Show arthritis.

NEUROLOGIC:  The patient has dementia.



PLAN:  Continue current medical management.  Psych consult reviewed.





DD: 10/16/2018 11:54

DT: 10/16/2018 23:14

JOB# [id number]  [id number]

## 2018-10-16 NOTE — PROGRESS NOTES
DATE:  10/15/2018



INTERNAL MEDICINE CONSULTATION FOLLOWUP



SUBJECTIVE:  The patient is a 73-year-old female with past medical history

significant diabetes mellitus, angiopathy, neuropathy, COPD, CHF, hypertension,

coronary artery disease, peptic ulcer disease, gastritis, arthritis,

osteoporosis.



CHIEF COMPLAINT:  Blood sugar stable.  No obvious shortness of breath, no

vomiting, no diarrhea, no melena, no hematochezia.



OBJECTIVE:

VITAL SIGNS:  Stable.

LUNGS:  Show occasional rhonchi, occasional crepitation, no bronchial breathing.

HEART:  First and second heart sounds normal.  No gallop.  Systolic present.

ABDOMEN:  Soft.  Bowel sounds are present and good.

EXTREMITIES:  Show arthritis.

NEUROLOGIC:  The patient has dementia.



PLAN:  Continue current medical management.  Psych consult reviewed.





DD: 10/15/2018 12:03

DT: 10/16/2018 06:46

JOB# [id number]  [id number]

## 2018-10-17 NOTE — PROGRESS NOTES
DATE:  10/16/2018



SUBJECTIVE:  Staff was spoken to.  The patient is interviewed.  Mood is noted to

be irritable.  Affect is constricted.  Insight and judgment at this time are

noted to be still impaired.  Impulse control is noted to be poor.  Coping skills

are noted to be very poor.  The patient has been having difficult time to cope

with the stress.  The patient continues to be very paranoid and very impulsive. 

The patient is not able to contract for safety.  No side effects to the

medications are noted.



ASSESSMENT:  The patient is still grossly psychotic.



PLAN:  To encouraged the patient to verbalize the concerns and the patient has

been grossly psychotic and impulsive, it is decided to increase the dose on the

Risperdal to 3 mg twice a day and follow the patient with the supportive

therapy.





DD: 10/16/2018 19:02

DT: 10/17/2018 01:32

JOB# [id number]  [id number]

## 2018-10-18 NOTE — PROGRESS NOTES
DATE:  10/17/2018



INTERNAL MEDICINE CONSULTATION FOLLOWUP



SUBJECTIVE:  The patient is a 73-year-old female.  Current medical problems

include diabetes mellitus, COPD, CHF, hypertension, coronary artery disease,

peptic ulcer, gastritis, and arthritis.



OBJECTIVE:

VITAL SIGNS:  Stable.

LUNGS:  Show bilateral rhonchi with crepitation, no bronchial breathing.

HEART:  First and second heart sounds are normal.

ABDOMEN:  Soft, bowel sounds present.

EXTREMITIES:  Show arthritis.

NEUROLOGIC:  The patient has dementia.



ASSESSMENT AND PLAN:  Continue current medical management.  Psych consult

reviewed.





DD: 10/17/2018 10:00

DT: 10/18/2018 06:32

JOB# [id number]  [id number]

## 2018-10-18 NOTE — PROGRESS NOTES
DATE:  10/18/2018



SUBJECTIVE:  Staff was spoken to.  The patient is interviewed.  Mood is noted to

be irritable.  Affect is constricted.  The patient is less irritable compared to

yesterday.  Mood swings are coming under control.  No side effects to the

medications are noted.  Insight and judgment are noted to be still impaired. 

The patient is currently on The Risperdal as well as Depakote and Trileptal.



ASSESSMENT:  The patient's mood swings are coming under control.



PLAN:  To continue the patient with the supportive therapy and followup.





DD: 10/18/2018 09:51

DT: 10/18/2018 13:13

JOB# [id number]  [id number]

## 2018-10-18 NOTE — PROGRESS NOTES
DATE:  10/17/2018



SUBJECTIVE:  Staff was spoken to.  The patient is interviewed.  Mood is noted to

be irritable.  Affect is constricted.  The patient is still talking to self, not

making much sense.  The patient's coping skills at this time are noted to be

very poor.  No side effects to the medications are noted.  The patient is

currently on the Depakote as well as the Risperdal, she has been taking 6 mg of

Risperdal a day.  Even with these medications, the patient is still having

difficult time and hence it is decided to add a little bit of the Trileptal to

the patient to see if it is going to be of some help to decrease the mood

swings.



ASSESSMENT:  The patient is still impulsive and not able to contract for safety.



PLAN:  To continue the patient with the supportive therapy and add the Trileptal

150 mg twice a day and follow the patient up with the supportive therapy.





DD: 10/17/2018 18:48

DT: 10/18/2018 01:14

JOB# [id number]  [id number]

## 2018-10-19 LAB
ALBUMIN SERPL-MCNC: 3.4 GM/DL (ref 3.7–5.3)
ALBUMIN/GLOB SERPL: 1.2 {RATIO} (ref 1–1.8)
ALP SERPL-CCNC: 47 U/L (ref 34–104)
ALT SERPL-CCNC: 13 U/L (ref 7–52)
ANION GAP SERPL CALC-SCNC: 10.7 MMOL/L (ref 7–16)
AST SERPL-CCNC: 17 U/L (ref 13–39)
BILIRUB SERPL-MCNC: 0.4 MG/DL (ref 0.3–1)
BUN SERPL-MCNC: 14 MG/DL (ref 7–25)
CALCIUM SERPL-MCNC: 9 MG/DL (ref 8.6–10.3)
CHLORIDE SERPL-SCNC: 102 MEQ/L (ref 98–107)
CO2 SERPL-SCNC: 27.8 MEQ/L (ref 21–31)
CREAT SERPL-MCNC: 0.8 MG/DL (ref 0.6–1.2)
GLOBULIN SER-MCNC: 2.8 GM/DL
GLUCOSE SERPL-MCNC: 125 MG/DL (ref 70–105)
POTASSIUM SERPL-SCNC: 3.5 MEQ/L (ref 3.5–5.1)
SODIUM SERPL-SCNC: 137 MEQ/L (ref 136–145)

## 2018-10-19 NOTE — PROGRESS NOTES
DATE:  10/19/2018



INTERNAL MEDICINE CONSULTATION FOLLOWUP



SUBJECTIVE:  The patient is a 73-year-old female.  Current medical problems

include diabetes mellitus, angiopathy, neuropathy, COPD, CHF, hypertension,

coronary artery disease, peptic ulcer disease, arthritis, osteoporosis.  No new

symptoms.  Blood sugars are stable.



OBJECTIVE:

VITAL SIGNS:  Stable.

LUNGS:  Show occasional rhonchi, occasional crepitation, no bronchial breathing.

HEART:  First and second heart sounds normal.  No gallop.  Systolic present.

ABDOMEN:  Soft.  Bowel sounds are present and good.

EXTREMITIES:  Show arthritis.

NEUROLOGIC:  The patient has dementia.



PLAN:  Continue current medical management.  Psych consult reviewed.





DD: 10/19/2018 09:54

DT: 10/19/2018 20:55

JOB# [id number]  [id number]

## 2018-10-19 NOTE — PROGRESS NOTES
DATE:  10/19/2018



PSYCHIATRIC PROGRESS NOTE



SUBJECTIVE:  Staff was spoken to.  The patient is interviewed.  Mood is noted to

be less irritable.  Affect is appropriate.  Not suicidal or homicidal.  Coping

skills are noted to be improving.  The patient has been able to verbalize the

concerns rather than to act out.



ASSESSMENT:  The patient is stabilizing.



PLAN:  To discharge the patient today for followup on outpatient basis.





DD: 10/19/2018 10:15

DT: 10/19/2018 20:38

[facility]# [id number]  [id number]